# Patient Record
Sex: MALE | Race: WHITE | Employment: UNEMPLOYED | URBAN - METROPOLITAN AREA
[De-identification: names, ages, dates, MRNs, and addresses within clinical notes are randomized per-mention and may not be internally consistent; named-entity substitution may affect disease eponyms.]

---

## 2017-05-18 PROBLEM — E11.3313 TYPE 2 DIABETES MELLITUS WITH BOTH EYES AFFECTED BY MODERATE NONPROLIFERATIVE RETINOPATHY AND MACULAR EDEMA, WITHOUT LONG-TERM CURRENT USE OF INSULIN (HCC): Status: ACTIVE | Noted: 2017-05-18

## 2017-08-29 PROBLEM — Z85.850 HX OF THYROID CANCER: Status: ACTIVE | Noted: 2017-08-29

## 2018-01-27 PROBLEM — E55.9 VITAMIN D INSUFFICIENCY: Status: ACTIVE | Noted: 2018-01-27

## 2018-01-29 PROCEDURE — 82043 UR ALBUMIN QUANTITATIVE: CPT | Performed by: FAMILY MEDICINE

## 2018-01-29 PROCEDURE — 82570 ASSAY OF URINE CREATININE: CPT | Performed by: FAMILY MEDICINE

## 2018-09-20 PROBLEM — B35.1 ONYCHOMYCOSIS OF MULTIPLE TOENAILS WITH TYPE 2 DIABETES MELLITUS: Status: ACTIVE | Noted: 2018-09-20

## 2018-09-20 PROBLEM — E11.69 ONYCHOMYCOSIS OF MULTIPLE TOENAILS WITH TYPE 2 DIABETES MELLITUS (HCC): Status: ACTIVE | Noted: 2018-09-20

## 2018-09-20 PROBLEM — E11.69 ONYCHOMYCOSIS OF MULTIPLE TOENAILS WITH TYPE 2 DIABETES MELLITUS: Status: ACTIVE | Noted: 2018-09-20

## 2018-09-20 PROBLEM — B35.1 ONYCHOMYCOSIS OF MULTIPLE TOENAILS WITH TYPE 2 DIABETES MELLITUS (HCC): Status: ACTIVE | Noted: 2018-09-20

## 2019-03-04 PROCEDURE — 86800 THYROGLOBULIN ANTIBODY: CPT | Performed by: INTERNAL MEDICINE

## 2019-03-04 PROCEDURE — 84432 ASSAY OF THYROGLOBULIN: CPT | Performed by: INTERNAL MEDICINE

## 2019-10-20 PROBLEM — E11.3313 CONTROLLED TYPE 2 DIABETES MELLITUS WITH BOTH EYES AFFECTED BY MODERATE NONPROLIFERATIVE RETINOPATHY AND MACULAR EDEMA, WITH LONG-TERM CURRENT USE OF INSULIN (HCC): Status: ACTIVE | Noted: 2017-05-18

## 2019-10-20 PROBLEM — Z79.4 CONTROLLED TYPE 2 DIABETES MELLITUS WITH BOTH EYES AFFECTED BY MODERATE NONPROLIFERATIVE RETINOPATHY AND MACULAR EDEMA, WITH LONG-TERM CURRENT USE OF INSULIN (HCC): Status: ACTIVE | Noted: 2017-05-18

## 2021-04-12 DIAGNOSIS — Z23 NEED FOR VACCINATION: ICD-10-CM

## 2022-08-10 ENCOUNTER — TELEPHONE (OUTPATIENT)
Dept: SURGERY | Facility: CLINIC | Age: 62
End: 2022-08-10

## 2022-08-10 NOTE — TELEPHONE ENCOUNTER
This pt needs to see me for consult next week. Hi friend, Ricki French asked me to see him. Please call pt and add him to my schedule for next week.   Thanks

## 2022-08-10 NOTE — TELEPHONE ENCOUNTER
GAY    Spoke with patient. Informed of MD's message below. Patient states he is going out of the country tomorrow. States he will be out of the country until September 15th. I advised patient that unfortunately I do not see any available consults available for today. I offered to assist in scheduling a consult appointment for when patient returns. PT declined, he states he will call back to schedule. No future appointments.

## 2024-11-25 NOTE — PROGRESS NOTES
[facility]  Initial Office Consultation    HPI:   Tacho Martinez is a 64 year old male with PMHx of thyroid cancer, kidney stones, DM, and HTN here today for LUTS.     Patient complains of nocturia 2-3 x for the past 2-3 years. Denies urinary complaints during the day.  Nocturia x 2-3  Daytime frequency q 2 hours  Duration:  2-3 years    The patient goes to bed at 11 and stops drinking right before going to bed. Is unsure if he snores. States he also has erectile dysfunction. States he has been unable to get an erection for approximately 3 years. States he has a prescription for Nitroglycerin that was given to him recently by his cardiologist.    PVR 34 mL.    Caffeine intake: occasional coffee, drinks 2 cups of tea per day (one in morning, one in evening), used to drink a lot of soda but has stopped.  drinks 3-4 16 oz bottles of water per day  Bowel function:  regular    Prior Treatment:    Medications:  Tamsulosin 0.4 mg every day    Surgical therapies:  no    Family history   Prostate cancer:  no   Enlarged prostate:  unknown      PSA history:     Latest Reference Range & Units 10/17/16 12:02 01/29/18 13:09 03/04/19 08/23/2023   PSA 0.01 - 4.00 ng/mL 0.69 0.56 0.53 0.6        Past Medical History:    CANCER    papillary thyroid ca microscopic path @ surg for multinodular goiter    HYPERTHYROIDISM    hx toxic multinodular goiter    HYPOTHYROIDISM    s/p total thyroidectomy    KIDNEY STONE    Other and unspecified hyperlipidemia    Type II or unspecified type diabetes mellitus without mention of complication, not stated as uncontrolled    Unspecified essential hypertension     Past Surgical History:   Procedure Laterality Date    Colonoscopy  2001     flex sig    Other surgical history  2005    total thyroidectomy for toxic goiter     Family History   Problem Relation Age of Onset    Heart Disorder Father         MI    Hypertension Father     Diabetes Father         ?    Hypertension Mother      Diabetes Sister      Social History     Socioeconomic History    Marital status:     Number of children: 2   Occupational History    Occupation: maintainance   Tobacco Use    Smoking status: Every Day     Current packs/day: 0.50     Types: Cigarettes    Smokeless tobacco: Never    Tobacco comments:     restarted > 10yrs ago   Vaping Use    Vaping status: Never Used   Substance and Sexual Activity    Alcohol use: No    Drug use: No     Current Outpatient Medications   Medication Sig Dispense Refill    glimepiride 4 MG Oral Tab TAKE 1 TABLET BY MOUTH TWICE DAILY 180 tablet 1    Insulin Pen Needle (BD PEN NEEDLE YASMANI U/F) 32G X 4 MM Does not apply Misc 1 each by Does not apply route daily. 300 each 1    Glucose Blood (ONETOUCH VERIO) In Vitro Strip TEST THREE TIMES DAILY 300 strip 1    metFORMIN HCl  MG Oral Tablet 24 Hr TAKE 2 TABLETS(1000 MG) BY MOUTH TWICE DAILY WITH MEALS. 360 tablet 1    insulin glargine (LANTUS SOLOSTAR) 100 UNIT/ML Subcutaneous Solution Pen-injector Use 30 units at bedtime 30 mL 1    Liraglutide (VICTOZA) 18 MG/3ML Subcutaneous Solution Pen-injector Use 1.8 mg daily 9 pen 1    LISINOPRIL 20 MG Oral Tab TAKE 1 TABLET(20 MG) BY MOUTH EVERY DAY 90 tablet 0    ATORVASTATIN 20 MG Oral Tab TAKE 1 TABLET BY MOUTH EVERY DAY 90 tablet 0    LEVOTHYROXINE SODIUM 175 MCG Oral Tab TAKE 1 TABLET BY MOUTH DAILY 90 tablet 0    AMLODIPINE BESYLATE 5 MG Oral Tab TAKE 1 TABLET(5 MG) BY MOUTH DAILY 90 tablet 0    HYDROCHLOROTHIAZIDE 12.5 MG Oral Tab TAKE 1 TABLET(12.5 MG) BY MOUTH DAILY 90 tablet 0    aspirin 81 MG Oral Tab EC Take 81 mg by mouth daily.      Lancet Devices (ONE TOUCH DELICA LANCING DEV) Does not apply Misc Use as directed. 1 Each 0       Allergies: Patient has no known allergies.    REVIEW OF SYSTEMS:  Review of Systems   All other systems reviewed and are negative.        EXAM:  There were no vitals taken for this visit.    Physical Exam  Constitutional:       Appearance: Normal  appearance.   HENT:      Head: Normocephalic and atraumatic.      Mouth/Throat:      Mouth: Mucous membranes are moist.   Pulmonary:      Effort: Pulmonary effort is normal.   Abdominal:      General: Abdomen is flat.      Palpations: Abdomen is soft.   Skin:     General: Skin is warm and dry.   Neurological:      Mental Status: He is alert and oriented to person, place, and time.   Psychiatric:         Mood and Affect: Mood normal.         Thought Content: Thought content normal.          LABS:  Lab Results   Component Value Date    PSA 0.53 03/04/2019     Lab Results   Component Value Date    WBC 10.39 01/29/2018    RBC 4.84 01/29/2018    HGB 14.1 01/29/2018    HCT 43.8 01/29/2018    MCV 90.5 01/29/2018    MCH 29.1 01/29/2018    MCHC 32.2 01/29/2018    RDW 13.5 01/29/2018     01/29/2018     Lab Results   Component Value Date     (HH) 12/17/2020    BUN 24.0 (H) 12/17/2020    BUNCREA 22.0 (H) 12/17/2020    CREATSERUM 1.07 12/17/2020    GFR >59 04/08/2010    CA 9.8 12/17/2020     (L) 12/17/2020    K 4.95 12/17/2020    CL 95 (L) 12/17/2020    CO2 24.4 12/17/2020     No results found for: \"PTP\", \"PT\", \"INR\"    IMAGING:  No results found.    IMPRESSION:  Nocturia  Erectile Dysfunction    Discussed with patient that nocturia without daytime urinary symptoms is often associated with OBED. Recommended behavioral management, switching to taking Tamsulosin at night, and speaking with his PCP about a sleep study. Also discussed that since the patient has a prescription for Nitroglycerin given to him by his cardiologist, medications for erectile dysfunction can be dangerous for him to take. Recommended trialing a NASIM. The patient was given a brochure and a referral to the sexual health clinic for other options. All questions answered.    PLAN:  - Behavioral management  - Continue Tamsulosin 0.4 mg at bedtime  - Speak with PCP regarding sleep study to assess for OBED  - Prescription for NASIM  - Follow-up in 3  months     Cady Santiago, APRN  11/26/2024

## 2024-11-26 ENCOUNTER — OFFICE VISIT (OUTPATIENT)
Dept: SURGERY | Facility: CLINIC | Age: 64
End: 2024-11-26

## 2024-11-26 ENCOUNTER — TELEPHONE (OUTPATIENT)
Dept: SURGERY | Facility: CLINIC | Age: 64
End: 2024-11-26

## 2024-11-26 DIAGNOSIS — R35.1 NOCTURIA: Primary | ICD-10-CM

## 2024-11-26 DIAGNOSIS — N52.9 ERECTILE DYSFUNCTION, UNSPECIFIED ERECTILE DYSFUNCTION TYPE: ICD-10-CM

## 2024-11-26 PROCEDURE — 99204 OFFICE O/P NEW MOD 45 MIN: CPT

## 2024-11-26 RX ORDER — TAMSULOSIN HYDROCHLORIDE 0.4 MG/1
0.4 CAPSULE ORAL DAILY
Qty: 90 CAPSULE | Refills: 3 | Status: SHIPPED | OUTPATIENT
Start: 2024-11-26 | End: 2025-11-21

## 2024-11-26 NOTE — TELEPHONE ENCOUNTER
NAOMIE Santiago gave me a form filled and signed to be sent out to [facility]. I will fax the patients form, patients insurance, and [facility] last visit notes to [phone]. Afterwards I will place it in the scanning bin.

## 2024-11-26 NOTE — PATIENT INSTRUCTIONS
Behavioral management includes timed voiding (going to the bathroom on a schedule every 1-4 hours), double voiding (trying to pee twice), avoiding bladder irritants (coffee, tea, soda, pop, alcohol, spicy and acidic foods), compression stockings, elevation of feet, voiding prior to bedtime, avoiding fluids 3 to 4 hours before bedtime and constipation management.

## 2024-12-04 ENCOUNTER — OFFICE VISIT (OUTPATIENT)
Dept: PODIATRY CLINIC | Facility: CLINIC | Age: 64
End: 2024-12-04

## 2024-12-04 DIAGNOSIS — B35.1 ONYCHOMYCOSIS OF MULTIPLE TOENAILS WITH TYPE 2 DIABETES MELLITUS (HCC): ICD-10-CM

## 2024-12-04 DIAGNOSIS — E11.69 ONYCHOMYCOSIS OF MULTIPLE TOENAILS WITH TYPE 2 DIABETES MELLITUS (HCC): ICD-10-CM

## 2024-12-04 DIAGNOSIS — L85.3 XEROSIS CUTIS: ICD-10-CM

## 2024-12-04 DIAGNOSIS — I87.2 VENOUS INSUFFICIENCY OF BOTH LOWER EXTREMITIES: Primary | ICD-10-CM

## 2024-12-04 PROCEDURE — 99204 OFFICE O/P NEW MOD 45 MIN: CPT | Performed by: PODIATRIST

## 2024-12-04 PROCEDURE — 11721 DEBRIDE NAIL 6 OR MORE: CPT | Performed by: PODIATRIST

## 2024-12-04 RX ORDER — AMMONIUM LACTATE 12 G/100G
1 LOTION TOPICAL DAILY
Qty: 400 G | Refills: 0 | Status: SHIPPED | OUTPATIENT
Start: 2024-12-04

## 2024-12-04 RX ORDER — CICLOPIROX 80 MG/ML
SOLUTION TOPICAL
Qty: 6.6 ML | Refills: 3 | Status: SHIPPED | OUTPATIENT
Start: 2024-12-04

## 2024-12-04 NOTE — PROGRESS NOTES
Reason for Visit      Tacho Martinez is a 64 year old male presents today complaining of bilateral diabetic foot evaluation.     History of Present Illness     Patient presents to clinic today for diabetic foot evaluation.  Patient is a non-insulin diabetic type II whose last A1c was 10.1 on 11/11/2024.  Patient presents to clinic today with numbness and tingling to bilateral lower extremities.  Patient is also complaining of elongated toenails that he is unable to debride himself.  Patient denies any open lesions at this time.    The following portions of the patient's history were reviewed and updated as appropriate: allergies, current medications, past family history, past medical history, past social history, past surgical history and problem list.    Allergies[1]      Current Outpatient Medications:     tamsulosin 0.4 MG Oral Cap, Take 1 capsule (0.4 mg total) by mouth daily. Take 1/2 hour following the same meal each day, Disp: 90 capsule, Rfl: 3    glimepiride 4 MG Oral Tab, TAKE 1 TABLET BY MOUTH TWICE DAILY, Disp: 180 tablet, Rfl: 1    Insulin Pen Needle (BD PEN NEEDLE YASMANI U/F) 32G X 4 MM Does not apply Misc, 1 each by Does not apply route daily., Disp: 300 each, Rfl: 1    Glucose Blood (ONETOUCH VERIO) In Vitro Strip, TEST THREE TIMES DAILY, Disp: 300 strip, Rfl: 1    metFORMIN HCl  MG Oral Tablet 24 Hr, TAKE 2 TABLETS(1000 MG) BY MOUTH TWICE DAILY WITH MEALS., Disp: 360 tablet, Rfl: 1    insulin glargine (LANTUS SOLOSTAR) 100 UNIT/ML Subcutaneous Solution Pen-injector, Use 30 units at bedtime, Disp: 30 mL, Rfl: 1    Liraglutide (VICTOZA) 18 MG/3ML Subcutaneous Solution Pen-injector, Use 1.8 mg daily, Disp: 9 pen, Rfl: 1    LISINOPRIL 20 MG Oral Tab, TAKE 1 TABLET(20 MG) BY MOUTH EVERY DAY, Disp: 90 tablet, Rfl: 0    ATORVASTATIN 20 MG Oral Tab, TAKE 1 TABLET BY MOUTH EVERY DAY, Disp: 90 tablet, Rfl: 0    LEVOTHYROXINE SODIUM 175 MCG Oral Tab, TAKE 1 TABLET BY MOUTH DAILY, Disp: 90 tablet, Rfl: 0     AMLODIPINE BESYLATE 5 MG Oral Tab, TAKE 1 TABLET(5 MG) BY MOUTH DAILY, Disp: 90 tablet, Rfl: 0    HYDROCHLOROTHIAZIDE 12.5 MG Oral Tab, TAKE 1 TABLET(12.5 MG) BY MOUTH DAILY, Disp: 90 tablet, Rfl: 0    aspirin 81 MG Oral Tab EC, Take 81 mg by mouth daily., Disp: , Rfl:     Lancet Devices (ONE TOUCH DELICA LANCING DEV) Does not apply Misc, Use as directed., Disp: 1 Each, Rfl: 0    There are no discontinued medications.    Patient Active Problem List   Diagnosis    Essential hypertension, benign    Postsurgical hypothyroidism    Insulin use (long-term) in type 2 diabetes (HCC)    Tobacco abuse    Microalbuminuria due to type 2 diabetes mellitus (HCC)    Hyperlipidemia LDL goal <100    Hx of thyroid cancer    Vitamin D insufficiency    Onychomycosis of multiple toenails with type 2 diabetes mellitus (HCC)       Past Medical History:    CANCER    papillary thyroid ca microscopic path @ surg for multinodular goiter    HYPERTHYROIDISM    hx toxic multinodular goiter    HYPOTHYROIDISM    s/p total thyroidectomy    KIDNEY STONE    Other and unspecified hyperlipidemia    Type II or unspecified type diabetes mellitus without mention of complication, not stated as uncontrolled    Unspecified essential hypertension       Past Surgical History:   Procedure Laterality Date    Colonoscopy  2001     flex sig    Other surgical history  2005    total thyroidectomy for toxic goiter       Family History   Problem Relation Age of Onset    Heart Disorder Father         MI    Hypertension Father     Diabetes Father         ?    Hypertension Mother     Diabetes Sister        Social History     Occupational History    Occupation: maintainance   Tobacco Use    Smoking status: Every Day     Current packs/day: 0.50     Types: Cigarettes    Smokeless tobacco: Never    Tobacco comments:     restarted > 10yrs ago   Vaping Use    Vaping status: Never Used   Substance and Sexual Activity    Alcohol use: No    Drug use: No    Sexual activity: Not  on file       ROS      Constitutional: negative for chills, fevers and sweats  Gastrointestinal: negative for abdominal pain, diarrhea, nausea and vomiting  Genitourinary:negative for dysuria and hematuria  Musculoskeletal:negative for arthralgias and muscle weakness  Neurological: negative for paresthesia and weakness  All others reviewed and negative.      Physical Exam     LE PHYSICAL EXAM    Constitution: Well-developed and well-nourished. Gait appears normal. No apparent distress. Alert and oriented to person, place, and time.  Integument: There are no varicosities. Skin appears moist, warm, and supple with positive hair growth. There are no color changes. No open lesions. No macerations, No Hyperkeratotic lesions.  Vascular examination: Dorsalis pedis and posterior tibial pulses are strong bilaterally with capillary filling time less than 3 seconds to all digits. There is no peripheral edema..  Neurological Sensorium: Grossly intact to sharp/dull. Vibratory: Intact.  Musculoskeletal:   5/5 pedal muscle strength b/l     Advanced trophic changes as: hair growth decrease  nail changes  (thickening) pigmentary changes (discoloration) skin texture (thin, shiny)   Procedure       Nails 1 through 5 bilateral debrided with use of a nail nipper.  Patient Toller procedure well had no complications.  Neurovascular status intact to the level of the digits post procedure.     Assessment and Plan     Encounter Diagnoses   Name Primary?    Venous insufficiency of both lower extremities Yes    Onychomycosis of multiple toenails with type 2 diabetes mellitus (HCC)     Xerosis cutis    - Sent a prescription for Amlactin  -Patient was educated on the etiology and treatment options for onychomycosis. Both topical, oral, laser, and nail removal were explained in great detail.     Diabetic education and instructions have been provided. We reviewed and discussed the following:    -risk categories related to pts with diabetes and  foot or lower extremity complications per ADA.    -adherence to medication regimen and close monitoring or blood sugar control.   -daily monitoring/inspection of feet and shoes.   -proper management of diet and weight   -regular follow up with PCP and specialty providers as recommended   -Lower extremity complications related to DM were reviewed and stressed prevention.       Patient was instructed to call the office or on-call podiatric physician immediately with any issues or concerns before the next scheduled visit. Patient to follow-up in clinic in 3 months      Lazara Serrano DPM, D.SHAKEEL ROMERO  Diplomat, American Board of Foot and Ankle Surgery  Certified in Foot and Rearfoot/Ankle Reconstruction  Fellow of the American College of Foot and Ankle Surgeons  Fellowship Trained Foot and Ankle Surgeon   [facility]     12/4/2024    7:09 AM         [1] No Known Allergies

## 2025-02-26 ENCOUNTER — TELEPHONE (OUTPATIENT)
Dept: SURGERY | Facility: CLINIC | Age: 65
End: 2025-02-26

## 2025-02-26 NOTE — TELEPHONE ENCOUNTER
[facility] 2025 Urology Office Visit   estefany SALGUERO created  Letter Sent  02/26/25  follow up

## 2025-04-17 ENCOUNTER — HOSPITAL ENCOUNTER (OUTPATIENT)
Age: 65
End: 2025-04-17
Attending: INTERNAL MEDICINE | Admitting: INTERNAL MEDICINE

## 2025-04-17 DIAGNOSIS — I25.10 CORONARY ARTERY DISEASE INVOLVING NATIVE CORONARY ARTERY OF NATIVE HEART, UNSPECIFIED WHETHER ANGINA PRESENT: ICD-10-CM

## 2025-05-01 ENCOUNTER — TELEPHONE (OUTPATIENT)
Dept: CARDIOLOGY | Age: 65
End: 2025-05-01

## 2025-05-06 ENCOUNTER — TELEPHONE (OUTPATIENT)
Dept: CARDIOLOGY | Age: 65
End: 2025-05-06